# Patient Record
Sex: FEMALE | URBAN - METROPOLITAN AREA
[De-identification: names, ages, dates, MRNs, and addresses within clinical notes are randomized per-mention and may not be internally consistent; named-entity substitution may affect disease eponyms.]

---

## 2022-05-25 ENCOUNTER — EMERGENCY (EMERGENCY)
Facility: HOSPITAL | Age: 22
LOS: 1 days | Discharge: HOME | End: 2022-05-25
Attending: EMERGENCY MEDICINE
Payer: COMMERCIAL

## 2022-05-25 VITALS
HEIGHT: 61 IN | WEIGHT: 111.99 LBS | SYSTOLIC BLOOD PRESSURE: 122 MMHG | HEART RATE: 92 BPM | DIASTOLIC BLOOD PRESSURE: 84 MMHG | RESPIRATION RATE: 20 BRPM | TEMPERATURE: 99 F | OXYGEN SATURATION: 99 %

## 2022-05-25 DIAGNOSIS — J36 PERITONSILLAR ABSCESS: ICD-10-CM

## 2022-05-25 LAB
ALBUMIN SERPL ELPH-MCNC: 4.9 G/DL — SIGNIFICANT CHANGE UP (ref 3.3–5)
ALP SERPL-CCNC: 79 U/L — SIGNIFICANT CHANGE UP (ref 40–120)
ALT FLD-CCNC: 9 U/L — LOW (ref 10–45)
ANION GAP SERPL CALC-SCNC: 21 MMOL/L — HIGH (ref 5–17)
AST SERPL-CCNC: 14 U/L — SIGNIFICANT CHANGE UP (ref 10–40)
BASOPHILS # BLD AUTO: 0.05 K/UL — SIGNIFICANT CHANGE UP (ref 0–0.2)
BASOPHILS NFR BLD AUTO: 0.4 % — SIGNIFICANT CHANGE UP (ref 0–2)
BILIRUB SERPL-MCNC: 0.4 MG/DL — SIGNIFICANT CHANGE UP (ref 0.2–1.2)
BUN SERPL-MCNC: 19 MG/DL — SIGNIFICANT CHANGE UP (ref 7–23)
CALCIUM SERPL-MCNC: 9.8 MG/DL — SIGNIFICANT CHANGE UP (ref 8.4–10.5)
CHLORIDE SERPL-SCNC: 102 MMOL/L — SIGNIFICANT CHANGE UP (ref 96–108)
CO2 SERPL-SCNC: 18 MMOL/L — LOW (ref 22–31)
CREAT SERPL-MCNC: 0.76 MG/DL — SIGNIFICANT CHANGE UP (ref 0.5–1.3)
EGFR: 115 ML/MIN/1.73M2 — SIGNIFICANT CHANGE UP
EOSINOPHIL # BLD AUTO: 0.01 K/UL — SIGNIFICANT CHANGE UP (ref 0–0.5)
EOSINOPHIL NFR BLD AUTO: 0.1 % — SIGNIFICANT CHANGE UP (ref 0–6)
GLUCOSE SERPL-MCNC: 92 MG/DL — SIGNIFICANT CHANGE UP (ref 70–99)
HCT VFR BLD CALC: 41.8 % — SIGNIFICANT CHANGE UP (ref 34.5–45)
HGB BLD-MCNC: 13.5 G/DL — SIGNIFICANT CHANGE UP (ref 11.5–15.5)
IMM GRANULOCYTES NFR BLD AUTO: 0.5 % — SIGNIFICANT CHANGE UP (ref 0–1.5)
LYMPHOCYTES # BLD AUTO: 19.6 % — SIGNIFICANT CHANGE UP (ref 13–44)
LYMPHOCYTES # BLD AUTO: 2.29 K/UL — SIGNIFICANT CHANGE UP (ref 1–3.3)
MCHC RBC-ENTMCNC: 29.7 PG — SIGNIFICANT CHANGE UP (ref 27–34)
MCHC RBC-ENTMCNC: 32.3 GM/DL — SIGNIFICANT CHANGE UP (ref 32–36)
MCV RBC AUTO: 91.9 FL — SIGNIFICANT CHANGE UP (ref 80–100)
MONOCYTES # BLD AUTO: 0.88 K/UL — SIGNIFICANT CHANGE UP (ref 0–0.9)
MONOCYTES NFR BLD AUTO: 7.5 % — SIGNIFICANT CHANGE UP (ref 2–14)
NEUTROPHILS # BLD AUTO: 8.39 K/UL — HIGH (ref 1.8–7.4)
NEUTROPHILS NFR BLD AUTO: 71.9 % — SIGNIFICANT CHANGE UP (ref 43–77)
NRBC # BLD: 0 /100 WBCS — SIGNIFICANT CHANGE UP (ref 0–0)
PLATELET # BLD AUTO: 309 K/UL — SIGNIFICANT CHANGE UP (ref 150–400)
POTASSIUM SERPL-MCNC: 4.3 MMOL/L — SIGNIFICANT CHANGE UP (ref 3.5–5.3)
POTASSIUM SERPL-SCNC: 4.3 MMOL/L — SIGNIFICANT CHANGE UP (ref 3.5–5.3)
PROT SERPL-MCNC: 8.7 G/DL — HIGH (ref 6–8.3)
RBC # BLD: 4.55 M/UL — SIGNIFICANT CHANGE UP (ref 3.8–5.2)
RBC # FLD: 11.8 % — SIGNIFICANT CHANGE UP (ref 10.3–14.5)
SODIUM SERPL-SCNC: 141 MMOL/L — SIGNIFICANT CHANGE UP (ref 135–145)
WBC # BLD: 11.68 K/UL — HIGH (ref 3.8–10.5)
WBC # FLD AUTO: 11.68 K/UL — HIGH (ref 3.8–10.5)

## 2022-05-25 PROCEDURE — 99285 EMERGENCY DEPT VISIT HI MDM: CPT

## 2022-05-25 PROCEDURE — 99244 OFF/OP CNSLTJ NEW/EST MOD 40: CPT

## 2022-05-25 PROCEDURE — 70491 CT SOFT TISSUE NECK W/DYE: CPT | Mod: 26,MA

## 2022-05-25 RX ORDER — SODIUM CHLORIDE 9 MG/ML
1000 INJECTION INTRAMUSCULAR; INTRAVENOUS; SUBCUTANEOUS
Refills: 0 | Status: DISCONTINUED | OUTPATIENT
Start: 2022-05-25 | End: 2022-05-29

## 2022-05-25 RX ORDER — DEXAMETHASONE 0.5 MG/5ML
6 ELIXIR ORAL ONCE
Refills: 0 | Status: DISCONTINUED | OUTPATIENT
Start: 2022-05-25 | End: 2022-05-25

## 2022-05-25 RX ORDER — DEXAMETHASONE 0.5 MG/5ML
4 ELIXIR ORAL ONCE
Refills: 0 | Status: COMPLETED | OUTPATIENT
Start: 2022-05-25 | End: 2022-05-25

## 2022-05-25 RX ORDER — DEXAMETHASONE 0.5 MG/5ML
10 ELIXIR ORAL EVERY 8 HOURS
Refills: 0 | Status: DISCONTINUED | OUTPATIENT
Start: 2022-05-25 | End: 2022-05-29

## 2022-05-25 RX ORDER — KETOROLAC TROMETHAMINE 30 MG/ML
15 SYRINGE (ML) INJECTION ONCE
Refills: 0 | Status: DISCONTINUED | OUTPATIENT
Start: 2022-05-25 | End: 2022-05-25

## 2022-05-25 RX ORDER — DEXAMETHASONE 0.5 MG/5ML
6 ELIXIR ORAL ONCE
Refills: 0 | Status: COMPLETED | OUTPATIENT
Start: 2022-05-25 | End: 2022-05-25

## 2022-05-25 RX ADMIN — Medication 100 MILLIGRAM(S): at 20:42

## 2022-05-25 RX ADMIN — Medication 15 MILLIGRAM(S): at 21:10

## 2022-05-25 RX ADMIN — Medication 4 MILLIGRAM(S): at 20:37

## 2022-05-25 RX ADMIN — Medication 6 MILLIGRAM(S): at 22:59

## 2022-05-25 RX ADMIN — Medication 15 MILLIGRAM(S): at 20:39

## 2022-05-25 RX ADMIN — Medication 600 MILLIGRAM(S): at 21:15

## 2022-05-25 RX ADMIN — SODIUM CHLORIDE 100 MILLILITER(S): 9 INJECTION INTRAMUSCULAR; INTRAVENOUS; SUBCUTANEOUS at 23:28

## 2022-05-25 NOTE — ED ADULT TRIAGE NOTE - CHIEF COMPLAINT QUOTE
sore throat, L tonsil pain, painful swallowing. sent by  for possible tonsil abscess. no respiratory distress though sounds hoarse.

## 2022-05-25 NOTE — ED CDU PROVIDER INITIAL DAY NOTE - OBJECTIVE STATEMENT
21 y.o. female no PMHx coming in with sore throat over the past 4 days. Pain and difficulty swallowing worsened today. Called PMD who sent patient to , who recommended she come to the ER.  Pain is worse with swallowing.  Some chills, no fevers at home. No chest, belly pain, urinary complaints, cough.  Has not taken anything for symptoms.  ED course: Left PTA. ENT consulted. Patient reports improving symptoms after 4 decadron. Plan for antibiotics, pain control, and steroids in CDU. 21 y.o. female no PMHx coming in with sore throat over the past 4 days. Pain and difficulty swallowing worsened today. Called PMD who sent patient to , who recommended she come to the ER.  Pain is worse with swallowing.  Some chills, no fevers at home. No chest, belly pain, urinary complaints, cough.  Has not taken anything for symptoms.  ED course: CT showed Left PTA. ENT consulted. Patient reports improving symptoms after 4mg of Decadron. Plan for antibiotics, pain control, and steroids in CDU.

## 2022-05-25 NOTE — ED ADULT NURSE NOTE - OBJECTIVE STATEMENT
22 YO female with no stated PMH via walk in presenting with complaints of throat pain. As per patient, pt has been having a "sore throat" since Saturday. Pt states she has been having difficulty swallowing, and feeling like it is difficult to talk, associated with chills and pain while talking. Pt denies fevers at home. Pt states that she was told she had [place] in March, but had no symptoms, therefore not started on any treatment. Pt denies chest pain, palpitations, shortness of breath, headache, visual disturbances, numbness/tingling, fever, diaphoresis,  nausea, vomiting, constipation, diarrhea, or urinary symptoms.   Pt Axox4, gross neuro intact, PERRL 3 mm. Redness to back of throat visualized. respirations even, & non-labored. pulses strong and equal bilaterally. Abdomen soft, non-tender, non-distended. Skin warm, dry, and intact. Pt placed in position of comfort. Pt educated on call bell system and provided call bell. Bed in lowest position, wheels locked, appropriate side rails raised. Pt denies needs at this time.

## 2022-05-25 NOTE — ED CDU PROVIDER DISPOSITION NOTE - NSFOLLOWUPINSTRUCTIONS_ED_ALL_ED_FT
Hydrate.     You can take Tylenol 650mg and/or Motrin 600mg every 6 hours as needed for pain.    Continue taking Clindamycin as prescribed. This medication is an antibiotic.    We recommend you follow up with your primary care provider within the next 2-3 days, please bring all of your results with you.     Please return to the Emergency Department with new, worsening, or concerning symptoms, such as:  -Shortness of breath or trouble breathing, inability to swallow  -Pressure, pain, tightness in chest  -Facial drooping, arm weakness, or speech difficulty   -Head injury or loss of consciousness     *More detailed information regarding your visit and discharge can be found by reviewing this packet Stay well hydrated. Eat a soft diet.     Take Ibuprofen (i.e. Motrin, Advil) 600mg every 8 hrs for pain as needed. Take with food. May alternate with Acetaminophen (Tylenol) 650mg every 6 hours for pain as needed.     Continue taking Clindamycin as prescribed. This medication is an antibiotic.  Please as take Medrol dosepack (steroids) as prescribed and use Peridex mouth rinse as directed.     We recommend you follow up with your primary care provider and ENT within the next 2-3 days, please bring all of your results with you.     Return to ER for any new/worsening throat pain/swelling, persistent fevers, vomiting, difficulty breathing or swallowing, unable to eat/drink, or any other concerning symptoms.    Jacek Tony (MD)  Otolaryngology  [address] [place] [address], [place] [address]  Phone: [phone]    Please read information on Peritonsillar Abscess provided.

## 2022-05-25 NOTE — ED PROVIDER NOTE - OBJECTIVE STATEMENT
21 y.o. female no PMHx coming in with sore throat over the past 4 days.  Pain and difficulty swallowing worsened today, was seen at  and her PCP that recommended she come to the ER.  Pain is worse with swallowing but able to tolerate soft and fluids.  Some chills, no fevers at home.  No chest or bell pain.  Has not taken anything for it.

## 2022-05-25 NOTE — ED ADULT NURSE NOTE - ED STAT RN HANDOFF DETAILS
Handoff report provided to oncoming nurse Adilia ROWLEY RN. Understands medications given, and plan of care for patient. Patient in stable condition, vital signs updated, and patient has no complaints at this time and has been updated on care plan. Explained to patient that new nurse is taking over, pt verbalized understanding.

## 2022-05-25 NOTE — ED CDU PROVIDER DISPOSITION NOTE - PATIENT PORTAL LINK FT
You can access the FollowMyHealth Patient Portal offered by [facility] by registering at the following website: [email or web address]. By joining [email or web address]’s FollowMyHealth portal, you will also be able to view your health information using other applications (apps) compatible with our system.

## 2022-05-25 NOTE — ED CDU PROVIDER DISPOSITION NOTE - CARE PROVIDER_API CALL
Jacek Tony)  Otolaryngology  [address], [place] [address]  Phone: [phone]  Fax: [phone]  Follow Up Time: 1-3 Days

## 2022-05-25 NOTE — ED PROVIDER NOTE - NSFOLLOWUPINSTRUCTIONS_ED_ALL_ED_FT
1- Motrin 600 mg every 6 hours for pain  2- Clindamycin 300 mg every 6 hours for 10 days  3- Follow up with Dr Morales  4- If you are having any worsening pain, difficulty swallowing, difficulty breathing, or any new or worsening symptoms come back to the ER immediately

## 2022-05-25 NOTE — ED CDU PROVIDER INITIAL DAY NOTE - NS ED ROS FT
Constitutional: No fever + chills  Eyes: No visual changes, eye pain  Throat: +Throat pain   CV: No chest pain or lower extremity edema  Resp: No SOB no cough  GI: No abd pain. No nausea or vomiting.   : No dysuria, hematuria.   MSK: No musculoskeletal pain  Skin: No rash  Psych: No complaints   Neuro: No headache. No numbness or tingling.   Endo: No DM

## 2022-05-25 NOTE — ED PROVIDER NOTE - NS ED ATTENDING STATEMENT MOD
This was a shared visit with the LOUISE. I reviewed and verified the documentation and independently performed the documented:

## 2022-05-25 NOTE — ED PROVIDER NOTE - CARE PLAN
1 Principal Discharge DX:	Pharyngitis   Principal Discharge DX:	Pharyngitis  Secondary Diagnosis:	Peritonsillar abscess

## 2022-05-25 NOTE — ED CDU PROVIDER DISPOSITION NOTE - CLINICAL COURSE
21 y.o. female no PMHx coming in with sore throat over the past 4 days. Pain and difficulty swallowing worsened today. Called PMD who sent patient to , who recommended she come to the ER.  Pain is worse with swallowing.  Some chills, no fevers at home. No chest, belly pain, urinary complaints, cough.  Has not taken anything for symptoms.  ED course: CT showed Left PTA. ENT consulted. Patient reports improving symptoms after 4mg of Decadron. Plan for antibiotics, pain control, and steroids in CDU. 21 y.o. female no PMHx coming in with sore throat over the past 4 days. Pain and difficulty swallowing worsened today. Called PMD who sent patient to , who recommended she come to the ER.  Pain is worse with swallowing.  Some chills, no fevers at home. No chest, belly pain, urinary complaints, cough.  Has not taken anything for symptoms.  ED course: CT showed Left PTA. ENT consulted. Patient reports improving symptoms after 4mg of Decadron. Plan for antibiotics, pain control, and steroids in CDU.  Pt feeling well in AM, reports throat pain/swelling improved. No trouble breathing/swallowing, no fevers. Pt tolerating PO. Seen by ENT PA. Stable for d/c on PO abx and steroids and to f/u in office.

## 2022-05-25 NOTE — ED PROVIDER NOTE - ATTENDING APP SHARED VISIT CONTRIBUTION OF CARE
needed assist Attending MD Krause:   I personally have seen and examined this patient. I have performed a substantive portion of the visit including all aspects of the medical decision making.   Physician assistant note reviewed and agree on plan of care and except where noted.            21F presenting with throat pain, voice changes and odynophagia x days. Exam with no stridor, mild trismus, b/l tonsillar hypertrophy with erythema, fullness to the left tonsillar pillar concerning for possible PTA though clinically equivalent. Will obtain CT to assess for possible PTA, steroids, IV abx, labs        *The above represents an initial assessment/impression. Please refer to progress notes for potential changes in patient clinical course*

## 2022-05-25 NOTE — ED CDU PROVIDER DISPOSITION NOTE - ATTENDING APP SHARED VISIT CONTRIBUTION OF CARE
Patient states she feels much better, tolerating PO, no respiratory distress speaking full sentences. Pt cleared by ENT for outpatient follow up. Discharge plan and return precautions discussed with patient.

## 2022-05-25 NOTE — ED CDU PROVIDER INITIAL DAY NOTE - PHYSICAL EXAMINATION
GEN: Well Appearing, Nontoxic, NAD  HEENT: NC/AT, Symm Facies. EOMI. + erythema of posterior oropharynx with more edema on the left than the right   CV: No JVD,+S1S2, RRR w/o m/g/r  RESP: CTAB w/o w/r/r  ABD: Soft, nt/nd  EXT/MSK: No lower extremity edema or calf tenderness. FROMx4  Neuro: Grossly intact, AOX3.   PSYCH: Appropriate mood and affect

## 2022-05-25 NOTE — ED PROVIDER NOTE - CARE PROVIDER_API CALL
Queta Morales)  Otolaryngology  [address] [place] [address], [address]  [place] [address]  Phone: [phone]  Fax: [phone]  Follow Up Time:

## 2022-05-25 NOTE — CONSULT NOTE ADULT - PROBLEM SELECTOR RECOMMENDATION 9
CDU for IV antibiotics and IV steroids   Pain control   Attending to see in AM- Dr Morales (possible drainage)

## 2022-05-25 NOTE — ED PROVIDER NOTE - ENMT, MLM
+ erythema of posterior oropharynx with more edema on the left than the right   No elevation of the floor of the mouth  no submental induration

## 2022-05-25 NOTE — ED CDU PROVIDER INITIAL DAY NOTE - DETAILS
PTA  -Clindamycin, decadron, ivf, pain control  -ENT consult  - ED team, vitals every 4 hours, frequent reevaluations

## 2022-05-25 NOTE — ED CDU PROVIDER DISPOSITION NOTE - NSFOLLOWUPCLINICS_GEN_ALL_ED_FT
[facility] ENT  ENT  [address], [address]  [place] [address]  Phone: [phone]  Fax:   Follow Up Time: 1-3 Days

## 2022-05-26 VITALS
HEART RATE: 90 BPM | TEMPERATURE: 97 F | DIASTOLIC BLOOD PRESSURE: 73 MMHG | SYSTOLIC BLOOD PRESSURE: 100 MMHG | RESPIRATION RATE: 16 BRPM | OXYGEN SATURATION: 98 %

## 2022-05-26 LAB
ANION GAP SERPL CALC-SCNC: 11 MMOL/L — SIGNIFICANT CHANGE UP (ref 5–17)
BASOPHILS # BLD AUTO: 0.02 K/UL — SIGNIFICANT CHANGE UP (ref 0–0.2)
BASOPHILS NFR BLD AUTO: 0.1 % — SIGNIFICANT CHANGE UP (ref 0–2)
BUN SERPL-MCNC: 22 MG/DL — SIGNIFICANT CHANGE UP (ref 7–23)
CALCIUM SERPL-MCNC: 8.8 MG/DL — SIGNIFICANT CHANGE UP (ref 8.4–10.5)
CHLORIDE SERPL-SCNC: 107 MMOL/L — SIGNIFICANT CHANGE UP (ref 96–108)
CO2 SERPL-SCNC: 19 MMOL/L — LOW (ref 22–31)
CREAT SERPL-MCNC: 0.61 MG/DL — SIGNIFICANT CHANGE UP (ref 0.5–1.3)
EGFR: 131 ML/MIN/1.73M2 — SIGNIFICANT CHANGE UP
EOSINOPHIL # BLD AUTO: 0 K/UL — SIGNIFICANT CHANGE UP (ref 0–0.5)
EOSINOPHIL NFR BLD AUTO: 0 % — SIGNIFICANT CHANGE UP (ref 0–6)
GLUCOSE SERPL-MCNC: 219 MG/DL — HIGH (ref 70–99)
HCT VFR BLD CALC: 35.6 % — SIGNIFICANT CHANGE UP (ref 34.5–45)
HGB BLD-MCNC: 11.5 G/DL — SIGNIFICANT CHANGE UP (ref 11.5–15.5)
IMM GRANULOCYTES NFR BLD AUTO: 0.5 % — SIGNIFICANT CHANGE UP (ref 0–1.5)
LYMPHOCYTES # BLD AUTO: 0.67 K/UL — LOW (ref 1–3.3)
LYMPHOCYTES # BLD AUTO: 4.6 % — LOW (ref 13–44)
MCHC RBC-ENTMCNC: 29.4 PG — SIGNIFICANT CHANGE UP (ref 27–34)
MCHC RBC-ENTMCNC: 32.3 GM/DL — SIGNIFICANT CHANGE UP (ref 32–36)
MCV RBC AUTO: 91 FL — SIGNIFICANT CHANGE UP (ref 80–100)
MONOCYTES # BLD AUTO: 1.39 K/UL — HIGH (ref 0–0.9)
MONOCYTES NFR BLD AUTO: 9.5 % — SIGNIFICANT CHANGE UP (ref 2–14)
NEUTROPHILS # BLD AUTO: 12.45 K/UL — HIGH (ref 1.8–7.4)
NEUTROPHILS NFR BLD AUTO: 85.3 % — HIGH (ref 43–77)
NRBC # BLD: 0 /100 WBCS — SIGNIFICANT CHANGE UP (ref 0–0)
PLATELET # BLD AUTO: 278 K/UL — SIGNIFICANT CHANGE UP (ref 150–400)
POTASSIUM SERPL-MCNC: 4.4 MMOL/L — SIGNIFICANT CHANGE UP (ref 3.5–5.3)
POTASSIUM SERPL-SCNC: 4.4 MMOL/L — SIGNIFICANT CHANGE UP (ref 3.5–5.3)
RBC # BLD: 3.91 M/UL — SIGNIFICANT CHANGE UP (ref 3.8–5.2)
RBC # FLD: 11.9 % — SIGNIFICANT CHANGE UP (ref 10.3–14.5)
SARS-COV-2 RNA SPEC QL NAA+PROBE: SIGNIFICANT CHANGE UP
SODIUM SERPL-SCNC: 137 MMOL/L — SIGNIFICANT CHANGE UP (ref 135–145)
WBC # BLD: 14.61 K/UL — HIGH (ref 3.8–10.5)
WBC # FLD AUTO: 14.61 K/UL — HIGH (ref 3.8–10.5)

## 2022-05-26 PROCEDURE — 31575 DIAGNOSTIC LARYNGOSCOPY: CPT

## 2022-05-26 PROCEDURE — 70491 CT SOFT TISSUE NECK W/DYE: CPT | Mod: MA

## 2022-05-26 PROCEDURE — G0378: CPT

## 2022-05-26 PROCEDURE — 99284 EMERGENCY DEPT VISIT MOD MDM: CPT | Mod: 25

## 2022-05-26 PROCEDURE — 99218: CPT

## 2022-05-26 PROCEDURE — 96375 TX/PRO/DX INJ NEW DRUG ADDON: CPT | Mod: XU

## 2022-05-26 PROCEDURE — U0003: CPT

## 2022-05-26 PROCEDURE — 96374 THER/PROPH/DIAG INJ IV PUSH: CPT | Mod: XU

## 2022-05-26 PROCEDURE — 31231 NASAL ENDOSCOPY DX: CPT | Mod: XU

## 2022-05-26 PROCEDURE — 42700 I&D ABSCESS PERITONSILLAR: CPT

## 2022-05-26 PROCEDURE — U0005: CPT

## 2022-05-26 PROCEDURE — 80053 COMPREHEN METABOLIC PANEL: CPT

## 2022-05-26 PROCEDURE — 96376 TX/PRO/DX INJ SAME DRUG ADON: CPT | Mod: XU

## 2022-05-26 PROCEDURE — 85025 COMPLETE CBC W/AUTO DIFF WBC: CPT

## 2022-05-26 PROCEDURE — 80048 BASIC METABOLIC PNL TOTAL CA: CPT

## 2022-05-26 PROCEDURE — 36415 COLL VENOUS BLD VENIPUNCTURE: CPT

## 2022-05-26 RX ORDER — CHLORHEXIDINE GLUCONATE 213 G/1000ML
1 SOLUTION TOPICAL
Qty: 1 | Refills: 0
Start: 2022-05-26

## 2022-05-26 RX ORDER — KETOROLAC TROMETHAMINE 30 MG/ML
15 SYRINGE (ML) INJECTION ONCE
Refills: 0 | Status: DISCONTINUED | OUTPATIENT
Start: 2022-05-26 | End: 2022-05-26

## 2022-05-26 RX ADMIN — Medication 102 MILLIGRAM(S): at 07:53

## 2022-05-26 RX ADMIN — Medication 100 MILLIGRAM(S): at 04:27

## 2022-05-26 RX ADMIN — Medication 15 MILLIGRAM(S): at 00:55

## 2022-05-26 RX ADMIN — Medication 15 MILLIGRAM(S): at 01:30

## 2022-05-26 NOTE — ED CDU PROVIDER SUBSEQUENT DAY NOTE - PROGRESS NOTE DETAILS
Patient seen and evaluated at bedside with Dr. Patel, resting comfortably, NAD. Reports feeling much better. Tolerating water overnight. Denies fever/chills, trouble breathing/swallowing. VSS. On exam, pt speaking in full sentences, posterior pharynx with +L tonsilar edema, uvula midline, no drooling/stridor. Pt seen by ENT PA this AM, no need to keep for third dose of decadron, plan to d/c on clinda and medrol dosepack to f/u in office. Plan to advance diet this AM and if patient remains feeling well likely d/c. Pt tolerated breakfast and remains feeling well. Stable for d/c on steroids and abx. D/w Dr. Patel.

## 2022-05-26 NOTE — CHART NOTE - NSCHARTNOTEFT_GEN_A_CORE
ENT Procedure Note    Name:                  Adia Moran	                Surgeon:   Jacek Tony MD	  				  Date of Procedure: 05/25/2022                           Assistant:  None    Record Number:	[id number]                              Anesthesia:  Topical Anesthesia     Preprocedure diagnosis:	Acute maxillary sinusitis (J01.00)    Postprocedure diagnosis:	Same    Procedure:  Bilateral maxillary sinus endoscopy  (73500)    INDICATION:  The patient is a 21 y.o. female with no significant past medical history who presents to the emergency room at [facility] with a chief complaint of throat pain for the past several days. The patient states she has had a sore throat over the past 4 days.  Pain and difficulty swallowing worsened today, was seen at  and her PCP that recommended she come to the ER.  Pain is worse with swallowing but able to tolerate soft and fluids.  Patient has facial/sinus headaches and some postnasal drip.  Some chills, no fevers at home.  No chest or bell pain.  Has not taken anything for it.    PROCEDURE:  The patient was seen and her nasal cavities were prepped and sprayed with topical neosynephrine anesthesia.   Following this, a zero degree flexible endoscope was passed into the left nasal vault, and passed posteriorly to the nasopharynx.  There was no evidence of any blood emanating from the left posterior superior lateral nasal wall. The scope was then slowly withdrawn and then rotated superiorly to visualize the middle turbinate and the inferior meatus and osteomeatal complex. The OMC on the left side appeared patent with no evidence of pus or obstruction.  The Maxillary sinus on the left side was then accessed through the inferior meatus.  The maxillary sinus had mild to moderate amount of mucoserous fluid within it without any evidence of masses or lesions.  The frontal duct was then inspected and appeared clear without any mucoid material flowing from it.  The Septum appeared straight.  The scope was then slowly withdrawn.  The zero degree endoscope was then introduced into the right nasal cavity and passed posteriorly to the nasopharynx. There were no masses visible.  There was no evidence of any bleeding emanating from the right posterior septum.  The endoscope was then rotated superiorly to visualize the middle turbinate and the inferior meatus and osteomeatal complex. The Maxillary sinus on the right side was then accessed via the inferior meatus.  There was a minimal amount of mucoserous fluid within the maxillary sinus with no evidence of any masses or pus.  Again the septum appeared to be straight.  The scope was then withdrawn.  The patient tolerated the procedure well.

## 2022-05-26 NOTE — ED CDU PROVIDER SUBSEQUENT DAY NOTE - HISTORY
Dr. Tony at bedside. Patient s/p I+D. Patient in NAD. Will give Toradol for pain. Per Dr. Tony, if patient able to tolerate PO in the morning can be discharged home. - Alberta Cox PA-C

## 2022-05-26 NOTE — CONSULT NOTE ADULT - ASSESSMENT
20 y/o female no PMHx coming in with sore throat over the past 4 days. On exam, left enlarged tonsil with erythema, no exudates. CT: enhancing left peritonsillar fluid collection measuring at least 1.8 x 1.4 cm 
Assessment:  The patient is a 21 y.o. female with no significant past medical history who presents to the emergency room at [facility] with a chief complaint of throat pain for the past several days.  Exam consistent with sinusits, pharyngitis and left peritonsillar abscess    Plan:  1)  incision and drainage of the left peritonsillar abscess at bedside  2) clindamycin x 7 days  3) decadron 10mg [id number] x 3 doses  4) CDU  5) ok to d/c home in AM if tolerating PO

## 2022-05-26 NOTE — CHART NOTE - NSCHARTNOTEFT_GEN_A_CORE
ENT Procedure Note    Name:                  Adia Moran	                Surgeon:   Jacek Tony MD	  				  Date of Procedure: 05/25/2022                           Assistant:  None    Record Number:	[id number]                              Anesthesia:  Topical Anesthesia       Preoperative diagnosis:	Dysphagia, unspecified (R13.10)  	  Postoperative diagnosis:	Dysphagia, unspecified (R13.10)    Procedure:		Flexible Fiberoptic Laryngoscopy  (45362)    INDICATION:   The patient is a 21 y.o. female with no significant past medical history who presents to the emergency room at [facility] with a chief complaint of throat pain for the past several days. The patient states she has had a sore throat over the past 4 days.  Pain and difficulty swallowing worsened today, was seen at  and her PCP that recommended she come to the ER.  Pain is worse with swallowing but able to tolerate soft and fluids.  Patient has facial/sinus headaches and some postnasal drip.  Some chills, no fevers at home.  No chest or bell pain.  Has not taken anything for it.    PROCEDURE: The patient was seen and her bilateral nasal cavities were prepped and sprayed with topical anesthesia of neosynephrine.   Following this, a fiberoptic flexible laryngoscope was passed into the left nasal cavity, and then slowly and meticulously moved posteriorly to the nasopharynx.  There was clear mucous within the nasal cavity.  Once at nasopharynx the skull base and lateral walls of the eustachian tubes were examined for lesions and masses.  There was no blood or masses within the nasopharynx. There was mild prominence of the nasopharyngeal soft tissue consistent with inflammatory reaction.  The fiberoptic endoscope was then carefully directed inferiorly and moved to the hypopharynx and glottis.  There was no fullness of the base of tongue.  There was 1-2+ prominence of the tonsils (left > right) and with exudate. There was no evidence of retropharyngeal erythema or edema.  There was diffuse erythema  of the pharyngeal wall, consistent with acute pharyngitis.  The true vocal cords appeared to be mobile bilaterally.  The airway was widely patent. The patient tolerated the procedure well.

## 2022-05-26 NOTE — CHART NOTE - NSCHARTNOTEFT_GEN_A_CORE
ENT Procedure Note    Name:                  Adia Moran	                Surgeon:   Jacek Tony MD	  				  Date of Procedure: 05/25/2022                           Assistant:  None    Record Number:	[id number]                              Anesthesia:  Topical Anesthesia     Preoperative diagnosis:	Acute Tonsillitis (J03.90); Peritonsillar Abscess (J36)    Postoperative diagnosis:	Same    Procedure:	              Incision and drainage abscess; left peritonsillar abscess(53071)      INDICATION: The patient is a 21 y.o. female with no significant past medical history who presents to the emergency room at [facility] with a chief complaint of throat pain for the past several days. The patient states she has had a sore throat over the past 4 days.  Pain and difficulty swallowing worsened today, was seen at  and her PCP that recommended she come to the ER.  Pain is worse with swallowing but able to tolerate soft and fluids.  Patient has facial/sinus headaches and some postnasal drip.  Some chills, no fevers at home.  No chest or bell pain.  Has not taken anything for it.    PROCEDURE:  After informed consent was obtained from the patient,  approximately 5 cc of 1% lidocaine with 1/100K parts epinephrine was injected into the left intraoral peritonsillar space.  Using a tongue blade and yankower suction, the tongue was retracted and left tonsillar pillars were identified.  Attention was turned to the left peritonsillar abscess.  Using a number 15 blade, a vertical incision was made in the superior to inferior direction along the anterior edge of the left palatal glossus muscle. The incision was carried down through the mucosa and sucutaneous tissue. Using blunt dissecting hemostats, dissection was carried down, posteriorly, to the peritonsillar space. The dissection was then carried superiorly until the abscess pocket was encountered. Copious amount of pus were expressed from the pocket.  Adequate hemostasis was achieved at the end of the case.   The patient tolerated the procedure well with no complications.

## 2022-05-26 NOTE — ED CDU PROVIDER SUBSEQUENT DAY NOTE - ATTENDING APP SHARED VISIT CONTRIBUTION OF CARE
Patient seen and examined this morning. Patient is s/p I and D by Dr. Tony last night and states she is feeling much better. Denies any sob, fever, able to tolerate liquids so far. On exam, Patient is awake,alert,oriented x 3. Patient is well appearing and in no acute distress. L tonsil + erythema and mild swelling. No stridor, airway patent. Patient's chest is clear to ausculation, +s1s2. Abdomen is soft nd/nt +BS.   Will advance diet in CDU and monitor symptoms. ENT consult appreciated this morning to discharge. Plan for discharge discussed with patient and follow up including return precautions.

## 2022-05-26 NOTE — PROGRESS NOTE ADULT - SUBJECTIVE AND OBJECTIVE BOX
ENT ISSUE/POD: throat pain    HPI: 21 y.o. female with no significant past medical history who presents to the emergency room at [facility] with a chief complaint of throat pain for the past several days. The patient states she has had a sore throat over the past 4 days.  Pain and difficulty swallowing worsened , was seen at  and her PCP that recommended she come to the ER.  Pain is worse with swallowing but able to tolerate soft and fluids.  Pt was found to have a left PTA S/P bedside I&D. Pt's symptoms improved significantly. Pt is tolerating a soft diet. No fevers, SOB, hoarseness, cough.    PAST MEDICAL & SURGICAL HISTORY:  No pertinent past medical history      No significant past surgical history        Allergies    penicillin (Unknown)    Intolerances      MEDICATIONS  (STANDING):  clindamycin IVPB 600 milliGRAM(s) IV Intermittent every 8 hours  dexAMETHasone  IVPB 10 milliGRAM(s) IV Intermittent every 8 hours  sodium chloride 0.9%. 1000 milliLiter(s) (100 mL/Hr) IV Continuous <Continuous>    MEDICATIONS  (PRN):      Social History: see consult    Family history: see consult    ROS:   ENT: all negative except as noted in HPI   Pulm: denies SOB, cough, hemoptysis  Neuro: denies numbness/tingling, loss of sensation  Endo: denies heat/cold intolerance, excessive sweating      Vital Signs Last 24 Hrs  T(C): 36.3 (26 May 2022 07:53), Max: 37.2 (25 May 2022 18:15)  T(F): 97.3 (26 May 2022 07:53), Max: 98.9 (25 May 2022 18:15)  HR: 90 (26 May 2022 07:53) (75 - 92)  BP: 100/73 (26 May 2022 07:53) (93/61 - 122/84)  BP(mean): 76 (25 May 2022 21:50) (76 - 76)  RR: 16 (26 May 2022 07:53) (16 - 20)  SpO2: 98% (26 May 2022 07:53) (98% - 99%)                          11.5   14.61 )-----------( 278      ( 26 May 2022 05:28 )             35.6    05-26    137  |  107  |  22  ----------------------------<  219<H>  4.4   |  19<L>  |  0.61    Ca    8.8      26 May 2022 05:28    TPro  8.7<H>  /  Alb  4.9  /  TBili  0.4  /  DBili  x   /  AST  14  /  ALT  9<L>  /  AlkPhos  79  05-25       PHYSICAL EXAM:  Gen: NAD  Skin: No rashes, bruises, or lesions  Head: Normocephalic, Atraumatic  Face: no edema, erythema, or fluctuance. Parotid glands soft without mass  Eyes: no scleral injection  Nose: Nares bilaterally patent, no discharge  Mouth: Incision in left tonsil healing will, no exudates, no active bleeding, No Stridor / Drooling / Trismus.  Mucosa moist, tongue/uvula midline  Neck: Flat, supple, no lymphadenopathy, trachea midline, no masses  Lymphatic: No lymphadenopathy  Resp: breathing easily, no stridor  Neuro: facial nerve intact, no facial droop

## 2022-05-26 NOTE — CONSULT NOTE ADULT - NOSE
nasal/sinus endoscopy: Maxillary sinus with mucoid fluid b/l via inferior meatus/clear discharge/inflamed mucosa/congestion

## 2022-05-26 NOTE — CONSULT NOTE ADULT - SUBJECTIVE AND OBJECTIVE BOX
CC: sore throat     HPI: 20 y/o female no PMHx coming in with sore throat over the past 4 days.  Pain and difficulty swallowing worsened today, was seen at  and her PCP that recommended she come to the ER.  Pain is worse with swallowing but able to tolerate soft and fluids.  Some chills, no fevers at home.  No chest or bell pain.  Has not taken anything for it. Has never had this before         PAST MEDICAL & SURGICAL HISTORY:    Allergies    penicillin (Unknown)    Intolerances      MEDICATIONS  (STANDING):    MEDICATIONS  (PRN):      Social History: lives with family     Family history: no pertinent family history     ROS:   ENT: all negative except as noted in HPI   CV: denies palpitations  Pulm: denies SOB, cough, hemoptysis  GI: denies change in apetite, indigestion, n/v  : denies pertinent urinary symptoms, urgency  Neuro: denies numbness/tingling, loss of sensation  Psych: denies anxiety  MS: denies muscle weakness, instability  Heme: denies easy bruising or bleeding  Endo: denies heat/cold intolerance, excessive sweating  Vascular: denies LE edema    Vital Signs Last 24 Hrs  T(C): 37.2 (25 May 2022 18:15), Max: 37.2 (25 May 2022 18:15)  T(F): 98.9 (25 May 2022 18:15), Max: 98.9 (25 May 2022 18:15)  HR: 92 (25 May 2022 18:15) (92 - 92)  BP: 122/84 (25 May 2022 18:15) (122/84 - 122/84)  BP(mean): --  RR: 20 (25 May 2022 18:15) (20 - 20)  SpO2: 99% (25 May 2022 18:15) (99% - 99%)                          13.5   11.68 )-----------( 309      ( 25 May 2022 20:12 )             41.8    05-25    141  |  102  |  19  ----------------------------<  92  4.3   |  18<L>  |  0.76    Ca    9.8      25 May 2022 20:12    TPro  8.7<H>  /  Alb  4.9  /  TBili  0.4  /  DBili  x   /  AST  14  /  ALT  9<L>  /  AlkPhos  79  05-25       PHYSICAL EXAM:  Gen: NAD  Skin: No rashes, bruises, or lesions  Head: Normocephalic, Atraumatic  Face: no edema, erythema, or fluctuance. Parotid glands soft without mass  Eyes: no scleral injection  Nose: Nares bilaterally patent, no discharge  Mouth: left enlarge tonsil with erythema, no exudates, pt unable to open mouth fully/relax tongue. Difficult visualization    Neck: Flat, supple, no lymphadenopathy, trachea midline, no masses  Lymphatic: No lymphadenopathy  Resp: breathing easily, no stridor  CV: no peripheral edema/cyanosis  GI: nondistended   Peripheral vascular: no JVD or edema  Neuro: facial nerve intact, no facial droop              IMAGING/ADDITIONAL STUDIES:   ACC: [id number] EXAM: CT NECK SOFT TISSUE IC    PROCEDURE DATE: 05/25/2022        INTERPRETATION: CLINICAL INDICATION: Left neck swelling and pain.    TECHNIQUE: Contrast enhanced CT performed of the neck.    60 cc of Omnipaque 350 contrast material was injected IV. 0 cc was discarded. Coronal and sagittal reformats were obtained.    COMPARISON: None.    FINDINGS:    Skull base and orbits: Retention cyst versus polyp in the bilateral maxillary sinuses. The orbits are normal.    Vascular structures: No thrombosis or critical stenosis.    Lymph nodes: Multiple enlarged reactive bilateral level 2A lymph nodes.    Suprahyoid neck: There is thickening of the bilateral tonsils with medialization of the tonsillar tissue resulting in narrowing of the posterior oropharynx. There is a periphery enhancing left peritonsillar fluid collection measuring at least 1.8 x 1.4 cm.    Otherwise, normal nasopharynx and retropharyngeal space. The salivary glands are symmetric without inflammatory change or focal lesion.    Infrahyoid neck: Normal.    Thyroid: Unremarkable.    Lung apices: Clear.    Spine: No suspicious lesions.    IMPRESSION:    Left peritonsillar abscess as described above.    Discussed with ROBINSON Lozano in the ED at 8:59 PM.    --- End of Report ---      
HPI: The patient is a 21 y.o. female with no significant past medical history who presents to the emergency room at [facility] with a chief complaint of throat pain for the past several days. The patient states she has had a sore throat over the past 4 days.  Pain and difficulty swallowing worsened today, was seen at  and her PCP that recommended she come to the ER.  Pain is worse with swallowing but able to tolerate soft and fluids.  Patient has facial/sinus headaches and some postnasal drip.  Some chills, no fevers at home.  No chest or bell pain.  Has not taken anything for it.    HIV:    HIV Test Questions:  · In accordance with [place] State law, we offer every patient who comes to our ED an HIV test. Would you like to be tested today?	Opt out    PAST MEDICAL/SURGICAL/FAMILY/SOCIAL HISTORY:    Tobacco Usage:  · Tobacco Usage	Unknown if ever smoked    ALLERGIES AND HOME MEDICATIONS:   Allergies:        Allergies:  	penicillin: Drug, Unknown    Home Medications:   * Outpatient Medication Status not yet specified    LABS:  CBC Full  -  ( 25 May 2022 20:12 )  WBC Count : 11.68 K/uL  Hemoglobin : 13.5 g/dL  Hematocrit : 41.8 %  Platelet Count - Automated : 309 K/uL  Mean Cell Volume : 91.9 fl  Mean Cell Hemoglobin : 29.7 pg  Mean Cell Hemoglobin Concentration : 32.3 gm/dL  Auto Neutrophil # : 8.39 K/uL  Auto Lymphocyte # : 2.29 K/uL  Auto Monocyte # : 0.88 K/uL  Auto Eosinophil # : 0.01 K/uL  Auto Basophil # : 0.05 K/uL  Auto Neutrophil % : 71.9 %  Auto Lymphocyte % : 19.6 %  Auto Monocyte % : 7.5 %  Auto Eosinophil % : 0.1 %  Auto Basophil % : 0.4 %

## 2022-05-26 NOTE — CONSULT NOTE ADULT - COMMENTS
Vital Signs Last 24 Hrs  T(C): 36.7 (25 May 2022 23:26), Max: 37.2 (25 May 2022 18:15)  T(F): 98.1 (25 May 2022 23:26), Max: 98.9 (25 May 2022 18:15)  HR: 78 (25 May 2022 23:26) (78 - 92)  BP: 102/67 (25 May 2022 23:26) (98/65 - 122/84)  BP(mean): 76 (25 May 2022 21:50) (76 - 76)  RR: 17 (25 May 2022 23:26) (17 - 20)  SpO2: 99% (25 May 2022 23:26) (98% - 99%)

## 2022-05-26 NOTE — CONSULT NOTE ADULT - BREASTS
PT RESTING COMFORTABLY IN BED, PLAYING ON CELL PHONE, AAOX4, UNLABORED BREATHING, SKIN W/D/I, NO ACUTE DISTRESS NOTED     Leny Malave, RN  09/25/18 1332 not examined

## 2022-05-26 NOTE — PROGRESS NOTE ADULT - ASSESSMENT
21 y.o. female with no significant past medical history who presents to the emergency room at [facility] with a chief complaint of throat pain for the past several days.  Pt was found to have a left peritonsillar abscess S/P bedside I&D overnight. Pt's symtoms improved significantly. Pt is tolerating a regular diet.     21 y.o. female with no significant past medical history who presents to the emergency room at [facility] with a chief complaint of throat pain for the past several days.  Pt was found to have a left peritonsillar abscess S/P bedside I&D overnight. Pt's symtoms improved significantly. Pt is tolerating a regular diet.

## 2022-05-26 NOTE — ED ADULT NURSE REASSESSMENT NOTE - NS ED NURSE REASSESS COMMENT FT1
Pt medicated for pain post I&D by ENT. Safety maintained. Will continue to monitor.
Pt states that her throat pain is now 2/10, where it was 8/10 when she arrived.
Pt received from MITUL Neely. Pt oriented to CDU & plan of care was discussed. Pt states she feels better than previously. Pt with some throat soreness. Pt denies any difficulty breathing or swallowing and is able to tolerate a sandwich with minimal discomfort. Pt aware to notify RN or PA for any difficulty breathing or swallowing. Safety & comfort measures maintained. Call bell in reach. Will continue to monitor.
0730- patient received alert & oriented X4. Complaining of very mils throat pain at this time. Oral fluids & soft food for breakfast very well tolerated. Denies difficulty swallowing.